# Patient Record
Sex: FEMALE | Race: WHITE
[De-identification: names, ages, dates, MRNs, and addresses within clinical notes are randomized per-mention and may not be internally consistent; named-entity substitution may affect disease eponyms.]

---

## 2017-02-27 ENCOUNTER — HOSPITAL ENCOUNTER (OUTPATIENT)
Dept: HOSPITAL 39 - US | Age: 57
Discharge: HOME | End: 2017-02-27
Attending: NURSE PRACTITIONER
Payer: MEDICARE

## 2017-02-27 DIAGNOSIS — R22.9: Primary | ICD-10-CM

## 2017-02-27 NOTE — US
Superficial sonogram left neck.



Indication: LEFT POSTERIOR NODE



Comparison: None.



Impression:



At the palpable area of concern there is a 8 mm hypoechoic

septated lymph node. No pathologically enlarged lymphadenopathy

identified.



Electronically signed by:  Chris Gomez MD  2/27/2017 11:45 AM

CST

## 2017-08-17 ENCOUNTER — HOSPITAL ENCOUNTER (OUTPATIENT)
Dept: HOSPITAL 39 - US | Age: 57
Discharge: HOME | End: 2017-08-17
Attending: NURSE PRACTITIONER
Payer: MEDICARE

## 2017-08-17 DIAGNOSIS — E11.9: Primary | ICD-10-CM

## 2017-08-17 DIAGNOSIS — M79.671: ICD-10-CM

## 2017-08-18 NOTE — US
EXAM DESCRIPTION:



Soft Tissue,Extremity right foot



CLINICAL HISTORY:



57 years, Female, PAIN IN RIGHT FOOT 



COMPARISON:







FINDINGS:



Targeted scanning performed of the right foot were patient has a

tender palpable lesion. A subcutaneous lesion present here

probably hypoechoic measuring 1.3 x 0.7 cm and axial

cross-section. The length of the lesion is about 4.0 cm. Small

amount of vascular in the mass. Mildly lobular, slightly

irregular, contours. 



IMPRESSION:



Targeted scanning identifies a hypoechoic mass apparently in the

subcutaneous tissues of the right foot. Small amount of intrinsic

vascularity in the mass which has mildly lobular smooth borders.

This is indeterminate lesion. Consider additional

characterization with MRI or CT scan



Electronically signed by:  John Dunn MD  8/18/2017 10:52 AM CDT

Workstation: [id number]

## 2017-09-05 ENCOUNTER — HOSPITAL ENCOUNTER (OUTPATIENT)
Dept: HOSPITAL 39 - MRI | Age: 57
Discharge: HOME | End: 2017-09-05
Attending: NURSE PRACTITIONER
Payer: MEDICARE

## 2017-09-05 DIAGNOSIS — M79.671: Primary | ICD-10-CM

## 2017-09-05 DIAGNOSIS — M72.2: ICD-10-CM

## 2017-09-05 NOTE — MRI
MRI right foot without contrast



INDICATION: Foot mass painful to touch



TECHNIQUE: Noncontrast MR imaging right foot



FINDINGS:



Marker capsules noted on the plantar aspect of the foot.



There is a bilobed ovoid appearing soft tissue mass in this area

measuring 23 mm long by up to 8 mm in thickness. This is along

the plantar musculature and corresponds to the palpable

abnormality.



No fracture or osseous destructive lesion.



There is mild adjacent soft tissue edema. The mass is slightly

heterogeneous and is intimate with the medial aspect of the

medial aspect of the plantar aponeurosis.



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

There is a tiny accessory navicular ossification. There is

tendinosis of the distal posterior tibialis tendon without

rupture or retraction.



Minimal osteophyte formation of the dorsal talonavicular joint.

Minimal osteoarthrosis of the first MTP joint.



IMPRESSION:



Ovoid somewhat bilobed soft tissue mass along the medial plantar

aspect of the foot at the level of the base of the first

metatarsal most likely plantar fibromatosis



Scattered mild osteoarthritic changes in the foot.



Electronically signed by:  Oliver Sanchez MD  9/5/2017 3:21 PM

CDT Workstation: [id number]

## 2017-09-27 ENCOUNTER — HOSPITAL ENCOUNTER (OUTPATIENT)
Dept: HOSPITAL 39 - MAMMO | Age: 57
End: 2017-09-27
Attending: NURSE PRACTITIONER
Payer: MEDICARE

## 2017-09-27 DIAGNOSIS — Z12.31: Primary | ICD-10-CM

## 2017-09-27 PROCEDURE — 77063 BREAST TOMOSYNTHESIS BI: CPT

## 2017-09-28 NOTE — MAM
EXAM DESCRIPTION: 

3D Screening BILATERAL



CLINICAL HISTORY: 

57 yearsFemaleSCREENING no complaints. Remote family history of

breast cancer. Postmenopausal. HRT more than five years ago.



COMPARISON: 

2-D digital screening bilateral studies 9/17/2015 and 10/10/2014.

 Report from prior examination also reviewed.



TECHNIQUE: 

Bilateral CC and MLO projection full-field images, 3-D

tomosynthesis digital mammographic technique. Also bilateral 

synthesized CC/ MLO full-field images.  CAD not utilized.



FINDINGS: 

The breast parenchymal density pattern is: Heterogeneously dense

breast tissue, which may obscure small masses. No skin thickening

or nipple retraction  bilateral axillary lymph nodes. Bilateral

solitary microcalcifications. Linear distribution of round

calcifications in the middle third of the right breast near the

posterior nipple line stable. Intramammary lymph node again noted

in the middle third of the right breast.

No focal, stellate mass or density, focal asymmetry , and no

suspicious microcalcifications bilaterally. Stable mammograms

compared to prior study, taking into account differences in

mammographic technique



IMPRESSION: 

BI-RADS CATEGORY: 2 - BENIGN FINDINGS.

FOLLOW UP: Routine digital bilateral  screening, one year

interval from  September 2017.



Written communication explaining the IMPRESSION and follow-up,

will be mailed to the patient and referring health care provider.



According to the [employer] College of [employer], yearly mammograms

are recommended starting at age 40 and continuing as long as a

woman is in good health.  Any breast change noted on a breast

self-exam should be reported promptly to the patient's healthcare

provider.  Breast MRI is recommended for women with an

approximately 20-25% or greater lifetime risk of breast cancer,

including women with a strong family history of breast or ovarian

cancer and women who have been treated for Hodgkin's disease.



A negative mammographic report should not delay tissue diagnosis

in patients with significant clinical history or physical

findings.



Extremely dense breast tissue limits the sensitivity of digital

mammography. 



Electronically signed by:  Cole Pitts MD  9/28/2017 11:31 AM

CDT Workstation: [id number]

## 2017-10-10 ENCOUNTER — HOSPITAL ENCOUNTER (OUTPATIENT)
Dept: HOSPITAL 39 - LAB.O | Age: 57
Discharge: HOME | End: 2017-10-10
Attending: NURSE PRACTITIONER
Payer: MEDICARE

## 2017-10-10 DIAGNOSIS — M25.871: Primary | ICD-10-CM

## 2017-10-10 NOTE — RAD
HISTORY:  PRE OP



TECHNIQUE: PA and lateral views of the chest.



COMPARISON:  None available.



FINDINGS: The lungs are well-inflated and clear. The

cardiomediastinal silhouette and pulmonary vasculature are within

normal limits. Mild thoracic spondylosis is present.



IMPRESSION: No radiographic evidence of acute cardiopulmonary

disease.







[id number] 



Electronically signed by:  Jim Alcaraz  10/10/2017 1:05 PM CDT

Workstation: [id number]

## 2018-05-04 ENCOUNTER — HOSPITAL ENCOUNTER (OUTPATIENT)
Dept: HOSPITAL 39 - YCFC.O | Age: 58
End: 2018-05-04
Attending: NURSE PRACTITIONER
Payer: COMMERCIAL

## 2018-05-04 DIAGNOSIS — I10: ICD-10-CM

## 2018-05-04 DIAGNOSIS — E78.2: Primary | ICD-10-CM

## 2018-05-04 DIAGNOSIS — E11.9: ICD-10-CM

## 2018-05-04 DIAGNOSIS — E03.9: ICD-10-CM

## 2018-07-31 ENCOUNTER — HOSPITAL ENCOUNTER (OUTPATIENT)
Dept: HOSPITAL 39 - YCFC.O | Age: 58
End: 2018-07-31
Attending: NURSE PRACTITIONER
Payer: COMMERCIAL

## 2018-07-31 DIAGNOSIS — E11.9: Primary | ICD-10-CM

## 2018-10-04 ENCOUNTER — HOSPITAL ENCOUNTER (OUTPATIENT)
Dept: HOSPITAL 39 - MAMMO | Age: 58
End: 2018-10-04
Attending: NURSE PRACTITIONER
Payer: COMMERCIAL

## 2018-10-04 DIAGNOSIS — Z12.31: Primary | ICD-10-CM

## 2018-10-09 NOTE — MAM
EXAM DESCRIPTION: 

3D Screening BILATERAL : Digital Mammography.



CLINICAL HISTORY: 

58 years Female SCREEN . No complaints. No family history or

personal history of breast cancer. Childbirth. Postmenopausal 16

years. Taken HRT 5 or more years ago..  Lifetime risk of

developing breast cancer (Tyrer-Cuzick model)(%):  5.3%



COMPARISON: 

Bilateral screening breast tomosynthesis 9/27/2017.  



TECHNIQUE: 

Bilateral CC and MLO projection full-field images, Digital

tomosynthesis mammographic technique.  Bilateral digital 2-D

full-field MLO images.  CAD not utilized. 



FINDINGS: 

The breast parenchymal density pattern is: Heterogeneously dense

breast tissue, which may obscure small masses. No skin thickening

or nipple retraction.  Bilateral solitary microcalcifications.

Stable group of microcalcifications in the mid right breast.

No new focal, stellate mass or density, focal asymmetry , and no

suspicious microcalcifications bilaterally. Stable mammograms

compared to prior study.  



IMPRESSION: 

Benign exam.



BIRAD CATEGORY: 2 BENIGN FINDINGS.



RECOMMENDATIONS:

FOLLOW UP: Routine digital bilateral  screening, one year

interval from  October 2018.



Written communication explaining the IMPRESSION and follow-up,

will be mailed to the patient and referring health care provider.



According to the American College of [employer]iology, yearly mammograms

are recommended starting at age 40 and continuing as long as a

woman is in good health.  Any breast change noted on a breast

self-exam should be reported promptly to the patient's healthcare

provider.  Breast MRI is recommended for women with an

approximately 20-25% or greater lifetime risk of breast cancer,

including women with a strong family history of breast or ovarian

cancer and women who have been treated for Hodgkin's disease.



A negative mammographic report should not delay tissue diagnosis

in patients with significant clinical history or physical

findings.



Extremely dense breast tissue limits the sensitivity of digital

mammography. 



Electronically signed by:  Cole Pitts MD  10/9/2018 11:23 AM

CDT Workstation: [id number]

## 2019-07-16 ENCOUNTER — HOSPITAL ENCOUNTER (OUTPATIENT)
Dept: HOSPITAL 39 - LAB.O | Age: 59
End: 2019-07-16
Attending: NURSE PRACTITIONER
Payer: COMMERCIAL

## 2019-07-16 DIAGNOSIS — N39.0: Primary | ICD-10-CM

## 2019-07-26 ENCOUNTER — HOSPITAL ENCOUNTER (OUTPATIENT)
Dept: HOSPITAL 39 - YCFC.O | Age: 59
End: 2019-07-26
Attending: NURSE PRACTITIONER
Payer: COMMERCIAL

## 2019-07-26 DIAGNOSIS — E11.65: Primary | ICD-10-CM

## 2019-08-14 ENCOUNTER — HOSPITAL ENCOUNTER (OUTPATIENT)
Dept: HOSPITAL 39 - LAB.O | Age: 59
End: 2019-08-14
Attending: NURSE PRACTITIONER
Payer: COMMERCIAL

## 2019-08-14 DIAGNOSIS — N39.0: Primary | ICD-10-CM

## 2020-05-07 ENCOUNTER — HOSPITAL ENCOUNTER (OUTPATIENT)
Dept: HOSPITAL 39 - MAMMO | Age: 60
End: 2020-05-07
Attending: FAMILY MEDICINE
Payer: COMMERCIAL

## 2020-05-07 DIAGNOSIS — Z12.31: Primary | ICD-10-CM

## 2020-05-07 NOTE — MAM
EXAM DESCRIPTION: 

3D Screening BILATERAL : Digital Mammography.



CLINICAL HISTORY: 

59 years Female SCREEN . No complaints. Remote family history of

breast cancer. Menarche age 11. Childbirth age 15. Menopause age

41. HRT 5 or more years ago. Lifetime risk of developing breast

cancer (Tyrer-Cuzick model)(%):  5.2.



COMPARISON: 

Bilateral screening digital breast tomosynthesis October 2018 and

September 2017.   



TECHNIQUE: 

Bilateral CC and MLO projection full-field images, with [employer]

[employer]placement digital tomosynthesis mammographic

technique. Bilateral 2-D digital full-field images, MLO and CC

projections, non-displaced. Bilateral digital 2-D full-field MLO

images. and CC images.  CAD available for 2-D images.  



FINDINGS: 

The breast parenchymal density pattern is: Heterogeneously dense

breast tissue, which may obscure small masses. No skin thickening

or nipple retraction.  Stable linear distribution of

calcifications anterior to mid right breast may be within a

vascular channel. Stable group of benign type

microcalcifications. Left breast. Right axillary lymph nodes.

Bilateral skin calcifications.

No new focal, stellate mass or density, focal asymmetry , and no

suspicious microcalcifications . Stable mammograms compared to

prior study.  



IMPRESSION: 

Benign exam.



BIRAD CATEGORY: 2 BENIGN FINDINGS.



RECOMMENDATIONS:

FOLLOW UP: Routine digital bilateral  mammographic screening, one

year interval from  May 2020.



Written communication explaining the IMPRESSION and follow-up,

will be mailed to the patient and referring health care provider.

 



According to the American College of [employer]iology, yearly mammograms

are recommended starting at age 40 and continuing as long as a

woman is in good health.  Any breast change noted on a breast

self-exam should be reported promptly to the patient's healthcare

provider.  Breast MRI is recommended for women with an

approximately 20-25% or greater lifetime risk of breast cancer,

including women with a strong family history of breast or ovarian

cancer and women who have been treated for Hodgkin's disease.



A negative mammographic report should not delay tissue diagnosis

in patients with significant clinical history or physical

findings.



Extremely dense breast tissue limits the sensitivity of digital

mammography. 



Electronically signed by:  Cole Pitts MD  5/7/2020 12:19 PM CDT

Workstation: [id number]

## 2020-07-06 ENCOUNTER — HOSPITAL ENCOUNTER (OUTPATIENT)
Dept: HOSPITAL 39 - YCFC.O | Age: 60
End: 2020-07-06
Attending: FAMILY MEDICINE
Payer: MEDICARE

## 2020-07-06 DIAGNOSIS — E11.9: Primary | ICD-10-CM

## 2020-10-05 ENCOUNTER — HOSPITAL ENCOUNTER (OUTPATIENT)
Dept: HOSPITAL 39 - YCFC.O | Age: 60
End: 2020-10-05
Attending: FAMILY MEDICINE
Payer: MEDICARE

## 2020-10-05 DIAGNOSIS — E03.9: ICD-10-CM

## 2020-10-05 DIAGNOSIS — E11.9: Primary | ICD-10-CM

## 2020-10-05 DIAGNOSIS — E78.2: ICD-10-CM

## 2020-12-29 ENCOUNTER — HOSPITAL ENCOUNTER (OUTPATIENT)
Dept: HOSPITAL 39 - YCFC.O | Age: 60
End: 2020-12-29
Attending: FAMILY MEDICINE
Payer: MEDICARE

## 2020-12-29 DIAGNOSIS — I10: ICD-10-CM

## 2020-12-29 DIAGNOSIS — E11.9: Primary | ICD-10-CM

## 2021-01-06 ENCOUNTER — HOSPITAL ENCOUNTER (OUTPATIENT)
Dept: HOSPITAL 39 - AMB | Age: 61
Discharge: HOME | End: 2021-01-06
Attending: INTERNAL MEDICINE
Payer: MEDICARE

## 2021-01-06 VITALS — OXYGEN SATURATION: 98 % | TEMPERATURE: 97.2 F | SYSTOLIC BLOOD PRESSURE: 149 MMHG | DIASTOLIC BLOOD PRESSURE: 85 MMHG

## 2021-01-06 DIAGNOSIS — K57.30: ICD-10-CM

## 2021-01-06 DIAGNOSIS — D12.2: ICD-10-CM

## 2021-01-06 DIAGNOSIS — E66.9: ICD-10-CM

## 2021-01-06 DIAGNOSIS — I10: ICD-10-CM

## 2021-01-06 DIAGNOSIS — Z90.710: ICD-10-CM

## 2021-01-06 DIAGNOSIS — E07.9: ICD-10-CM

## 2021-01-06 DIAGNOSIS — Z86.010: ICD-10-CM

## 2021-01-06 DIAGNOSIS — D12.0: ICD-10-CM

## 2021-01-06 DIAGNOSIS — Z80.0: ICD-10-CM

## 2021-01-06 DIAGNOSIS — E11.9: ICD-10-CM

## 2021-01-06 DIAGNOSIS — R19.5: Primary | ICD-10-CM

## 2021-01-06 PROCEDURE — 88305 TISSUE EXAM BY PATHOLOGIST: CPT

## 2021-01-06 PROCEDURE — 00811 ANES LWR INTST NDSC NOS: CPT

## 2021-01-06 PROCEDURE — 45385 COLONOSCOPY W/LESION REMOVAL: CPT

## 2021-01-06 PROCEDURE — 36416 COLLJ CAPILLARY BLOOD SPEC: CPT

## 2021-01-06 PROCEDURE — 36415 COLL VENOUS BLD VENIPUNCTURE: CPT

## 2021-01-06 PROCEDURE — 82948 REAGENT STRIP/BLOOD GLUCOSE: CPT

## 2021-01-06 NOTE — OP
DATE OF PROCEDURE:  01/06/21



PREOPERATIVE DIAGNOSIS:

1.  Positive Cologuard.

2.  Family history of colon cancer in the patient's mother.

3.  Personal history of colon polyps.



POSTOPERATIVE DIAGNOSIS:

1.  Two colon polyps.

2.  Sigmoid diverticulosis.



PROCEDURE:

1.  Colonoscopy plus polypectomy.



SURGEON:  Yaakov Hyde MD.



COMPLICATIONS:  None apparent.  



BLOOD LOSS:  None.



MEDICATIONS:  Monitored anesthesia care.



DESCRIPTION OF PROCEDURE:  Informed consent was obtained prior to sedation.  The
preprocedure cardiopulmonary assessment was satisfactory.  The patient was 
placed in the left lateral decubitus position and was sedated.  A digital rectal
exam was unremarkable.  The tip of the Olympus colonoscope was inserted in the 
rectum and guided over to the cecum.  The cecum was identified by locating the 
ileocecal valve and appendiceal orifice.  Retroflexed view of the right colon 
was obtained.  The [place] Bowel Prep Score was 5.  I do think I was able to 
clear the mucosa enough with irrigation in order to rule out colon cancer.  
However, I think it is possible that tiny polyps could have been overlooked due 
to the suboptimal prep.  The mucosa of the cecum, ascending colon, hepatic 
flexure, transverse colon, splenic flexure, descending colon and sigmoid colon 
was closely examined.  Direct and retroflexed views of the rectum were obtained.
 The patient had two small colon polyps seen.  She had a 2 mm polyp in the cecum
that was removed with a cold snare and recovered.  She had a 3 mm ascending 
colon polyp that was removed with a cold snare and recovered.  The patient had 
some sigmoid diverticulosis.  Otherwise, her colonoscopy was unremarkable.



RECOMMENDATIONS:  

1.  Followup polyp pathology.

2.  Due to the patient's suboptimal prep, I do recommend followup colonoscopy in

     3 years.

3.  Followup with me p.r.n.



#[id number]



cc:  Medhat Selby MD

[facility] HospitalSTEPHANIE